# Patient Record
Sex: FEMALE | Race: ASIAN | NOT HISPANIC OR LATINO | Employment: STUDENT | URBAN - METROPOLITAN AREA
[De-identification: names, ages, dates, MRNs, and addresses within clinical notes are randomized per-mention and may not be internally consistent; named-entity substitution may affect disease eponyms.]

---

## 2024-08-15 ENCOUNTER — APPOINTMENT (OUTPATIENT)
Dept: CT IMAGING | Facility: CLINIC | Age: 19
End: 2024-08-15
Attending: PHYSICIAN ASSISTANT

## 2024-08-15 ENCOUNTER — HOSPITAL ENCOUNTER (EMERGENCY)
Facility: CLINIC | Age: 19
Discharge: HOME | End: 2024-08-15
Admitting: PHYSICIAN ASSISTANT

## 2024-08-15 VITALS
DIASTOLIC BLOOD PRESSURE: 59 MMHG | TEMPERATURE: 98 F | WEIGHT: 105.8 LBS | HEART RATE: 50 BPM | RESPIRATION RATE: 16 BRPM | BODY MASS INDEX: 18.06 KG/M2 | OXYGEN SATURATION: 97 % | HEIGHT: 64 IN | SYSTOLIC BLOOD PRESSURE: 105 MMHG

## 2024-08-15 DIAGNOSIS — R07.9 CHEST PAIN: ICD-10-CM

## 2024-08-15 DIAGNOSIS — R11.2 CANNABINOID HYPEREMESIS SYNDROME: ICD-10-CM

## 2024-08-15 DIAGNOSIS — F12.90 CANNABINOID HYPEREMESIS SYNDROME: ICD-10-CM

## 2024-08-15 DIAGNOSIS — R11.2 NAUSEA AND VOMITING: ICD-10-CM

## 2024-08-15 LAB
ALBUMIN SERPL BCG-MCNC: 4.7 G/DL (ref 3.5–5.2)
ALP SERPL-CCNC: 49 U/L (ref 40–150)
ALT SERPL W P-5'-P-CCNC: 8 U/L (ref 0–50)
ANION GAP SERPL CALCULATED.3IONS-SCNC: 14 MMOL/L (ref 7–15)
AST SERPL W P-5'-P-CCNC: 21 U/L (ref 0–45)
ATRIAL RATE - MUSE: 49 BPM
BASOPHILS # BLD AUTO: 0.1 10E3/UL (ref 0–0.2)
BASOPHILS NFR BLD AUTO: 0 %
BILIRUB DIRECT SERPL-MCNC: <0.2 MG/DL (ref 0–0.3)
BILIRUB SERPL-MCNC: 0.7 MG/DL
BUN SERPL-MCNC: 13.2 MG/DL (ref 6–20)
CALCIUM SERPL-MCNC: 9.3 MG/DL (ref 8.8–10.4)
CHLORIDE SERPL-SCNC: 103 MMOL/L (ref 98–107)
CREAT SERPL-MCNC: 0.74 MG/DL (ref 0.51–0.95)
D DIMER PPP FEU-MCNC: 0.52 UG/ML FEU (ref 0–0.5)
DIASTOLIC BLOOD PRESSURE - MUSE: 68 MMHG
EGFRCR SERPLBLD CKD-EPI 2021: >90 ML/MIN/1.73M2
EOSINOPHIL # BLD AUTO: 0 10E3/UL (ref 0–0.7)
EOSINOPHIL NFR BLD AUTO: 0 %
ERYTHROCYTE [DISTWIDTH] IN BLOOD BY AUTOMATED COUNT: 12.7 % (ref 10–15)
GLUCOSE SERPL-MCNC: 107 MG/DL (ref 70–99)
HCG SERPL QL: NEGATIVE
HCO3 SERPL-SCNC: 25 MMOL/L (ref 22–29)
HCT VFR BLD AUTO: 45.8 % (ref 35–47)
HGB BLD-MCNC: 15.5 G/DL (ref 11.7–15.7)
IMM GRANULOCYTES # BLD: 0.1 10E3/UL
IMM GRANULOCYTES NFR BLD: 1 %
INTERPRETATION ECG - MUSE: NORMAL
LIPASE SERPL-CCNC: 34 U/L (ref 13–60)
LYMPHOCYTES # BLD AUTO: 0.9 10E3/UL (ref 0.8–5.3)
LYMPHOCYTES NFR BLD AUTO: 7 %
MAGNESIUM SERPL-MCNC: 2.3 MG/DL (ref 1.7–2.3)
MCH RBC QN AUTO: 30.1 PG (ref 26.5–33)
MCHC RBC AUTO-ENTMCNC: 33.8 G/DL (ref 31.5–36.5)
MCV RBC AUTO: 89 FL (ref 78–100)
MONOCYTES # BLD AUTO: 0.5 10E3/UL (ref 0–1.3)
MONOCYTES NFR BLD AUTO: 4 %
NEUTROPHILS # BLD AUTO: 11.5 10E3/UL (ref 1.6–8.3)
NEUTROPHILS NFR BLD AUTO: 88 %
NRBC # BLD AUTO: 0 10E3/UL
NRBC BLD AUTO-RTO: 0 /100
P AXIS - MUSE: 54 DEGREES
PLATELET # BLD AUTO: 324 10E3/UL (ref 150–450)
POTASSIUM SERPL-SCNC: 3.5 MMOL/L (ref 3.4–5.3)
PR INTERVAL - MUSE: 160 MS
PROT SERPL-MCNC: 8.2 G/DL (ref 6.4–8.3)
QRS DURATION - MUSE: 98 MS
QT - MUSE: 474 MS
QTC - MUSE: 428 MS
R AXIS - MUSE: 83 DEGREES
RBC # BLD AUTO: 5.15 10E6/UL (ref 3.8–5.2)
SODIUM SERPL-SCNC: 142 MMOL/L (ref 135–145)
SYSTOLIC BLOOD PRESSURE - MUSE: 110 MMHG
T AXIS - MUSE: 59 DEGREES
TROPONIN T SERPL HS-MCNC: <6 NG/L
VENTRICULAR RATE- MUSE: 49 BPM
WBC # BLD AUTO: 13 10E3/UL (ref 4–11)

## 2024-08-15 PROCEDURE — 99285 EMERGENCY DEPT VISIT HI MDM: CPT | Mod: 25

## 2024-08-15 PROCEDURE — 96361 HYDRATE IV INFUSION ADD-ON: CPT

## 2024-08-15 PROCEDURE — 84155 ASSAY OF PROTEIN SERUM: CPT | Performed by: PHYSICIAN ASSISTANT

## 2024-08-15 PROCEDURE — 82565 ASSAY OF CREATININE: CPT | Performed by: PHYSICIAN ASSISTANT

## 2024-08-15 PROCEDURE — 83735 ASSAY OF MAGNESIUM: CPT | Performed by: PHYSICIAN ASSISTANT

## 2024-08-15 PROCEDURE — 96374 THER/PROPH/DIAG INJ IV PUSH: CPT | Mod: 59

## 2024-08-15 PROCEDURE — 93005 ELECTROCARDIOGRAM TRACING: CPT | Performed by: EMERGENCY MEDICINE

## 2024-08-15 PROCEDURE — 84484 ASSAY OF TROPONIN QUANT: CPT | Performed by: PHYSICIAN ASSISTANT

## 2024-08-15 PROCEDURE — 85379 FIBRIN DEGRADATION QUANT: CPT | Performed by: PHYSICIAN ASSISTANT

## 2024-08-15 PROCEDURE — 71275 CT ANGIOGRAPHY CHEST: CPT

## 2024-08-15 PROCEDURE — 96375 TX/PRO/DX INJ NEW DRUG ADDON: CPT

## 2024-08-15 PROCEDURE — 85025 COMPLETE CBC W/AUTO DIFF WBC: CPT | Performed by: PHYSICIAN ASSISTANT

## 2024-08-15 PROCEDURE — 82374 ASSAY BLOOD CARBON DIOXIDE: CPT | Performed by: PHYSICIAN ASSISTANT

## 2024-08-15 PROCEDURE — 84703 CHORIONIC GONADOTROPIN ASSAY: CPT | Performed by: PHYSICIAN ASSISTANT

## 2024-08-15 PROCEDURE — 83690 ASSAY OF LIPASE: CPT | Performed by: PHYSICIAN ASSISTANT

## 2024-08-15 PROCEDURE — 250N000011 HC RX IP 250 OP 636: Performed by: PHYSICIAN ASSISTANT

## 2024-08-15 PROCEDURE — 36415 COLL VENOUS BLD VENIPUNCTURE: CPT | Performed by: PHYSICIAN ASSISTANT

## 2024-08-15 PROCEDURE — 250N000013 HC RX MED GY IP 250 OP 250 PS 637: Performed by: PHYSICIAN ASSISTANT

## 2024-08-15 PROCEDURE — 258N000003 HC RX IP 258 OP 636: Performed by: PHYSICIAN ASSISTANT

## 2024-08-15 RX ORDER — LIDOCAINE 4 G/G
1 PATCH TOPICAL ONCE
Status: DISCONTINUED | OUTPATIENT
Start: 2024-08-15 | End: 2024-08-15 | Stop reason: HOSPADM

## 2024-08-15 RX ORDER — IOPAMIDOL 755 MG/ML
75 INJECTION, SOLUTION INTRAVASCULAR ONCE
Status: COMPLETED | OUTPATIENT
Start: 2024-08-15 | End: 2024-08-15

## 2024-08-15 RX ORDER — ONDANSETRON 4 MG/1
4 TABLET, ORALLY DISINTEGRATING ORAL EVERY 8 HOURS PRN
Qty: 15 TABLET | Refills: 0 | Status: SHIPPED | OUTPATIENT
Start: 2024-08-15

## 2024-08-15 RX ORDER — LIDOCAINE 4 G/G
1 PATCH TOPICAL
Status: DISCONTINUED | OUTPATIENT
Start: 2024-08-15 | End: 2024-08-15

## 2024-08-15 RX ORDER — ONDANSETRON 2 MG/ML
4 INJECTION INTRAMUSCULAR; INTRAVENOUS ONCE
Status: COMPLETED | OUTPATIENT
Start: 2024-08-15 | End: 2024-08-15

## 2024-08-15 RX ORDER — KETOROLAC TROMETHAMINE 15 MG/ML
15 INJECTION, SOLUTION INTRAMUSCULAR; INTRAVENOUS ONCE
Status: COMPLETED | OUTPATIENT
Start: 2024-08-15 | End: 2024-08-15

## 2024-08-15 RX ADMIN — ONDANSETRON 4 MG: 2 INJECTION INTRAMUSCULAR; INTRAVENOUS at 13:15

## 2024-08-15 RX ADMIN — SODIUM CHLORIDE 1000 ML: 9 INJECTION, SOLUTION INTRAVENOUS at 13:15

## 2024-08-15 RX ADMIN — LIDOCAINE 1 PATCH: 4 PATCH TOPICAL at 14:53

## 2024-08-15 RX ADMIN — IOPAMIDOL 75 ML: 755 INJECTION, SOLUTION INTRAVENOUS at 14:29

## 2024-08-15 RX ADMIN — KETOROLAC TROMETHAMINE 15 MG: 15 INJECTION, SOLUTION INTRAMUSCULAR; INTRAVENOUS at 13:18

## 2024-08-15 ASSESSMENT — COLUMBIA-SUICIDE SEVERITY RATING SCALE - C-SSRS
1. IN THE PAST MONTH, HAVE YOU WISHED YOU WERE DEAD OR WISHED YOU COULD GO TO SLEEP AND NOT WAKE UP?: NO
6. HAVE YOU EVER DONE ANYTHING, STARTED TO DO ANYTHING, OR PREPARED TO DO ANYTHING TO END YOUR LIFE?: NO
2. HAVE YOU ACTUALLY HAD ANY THOUGHTS OF KILLING YOURSELF IN THE PAST MONTH?: NO

## 2024-08-15 ASSESSMENT — ACTIVITIES OF DAILY LIVING (ADL)
ADLS_ACUITY_SCORE: 35

## 2024-08-15 NOTE — DISCHARGE INSTRUCTIONS
I have a suspicion that you are dealing with cannabinoid hyperemesis syndrome with your regular marijuana use.    The chest pain you are experiencing is likely due from all of the vomiting you are having.    We could not identify anything acutely serious or life-threatening here based on your labs and imaging.    I have sent you an antinausea medication to the pharmacy to use as needed.  I recommend that you discontinue all marijuana products.    Use Tylenol and ibuprofen as well as heating pads for the chest pain.  -Acetaminophen (Tylenol) and Ibuprofen (Advil, Motrin)   >These two medications can be alternated every 3-4 hours or taken together every 6 hours.   >Acetaminophen (Tylenol): Appropriate doses include 500mg, 650mg, 1000mg (not to exceed 4000mg in 24 hours)  >Ibuprofen (Advil, Motrin): Appropriate doses include 400mg, 600mg, 800mg (not to exceed 3200mg in 24 hours)   -Topical analgesics  >Lidocaine patches, IcyHot, Biofreeze, [place] Balm, etc.   -Ice and heat   >Ice is good for acute injuries that cause swelling and pain.   >Heat is good for aches and pains including pulled muscles or muscle spasms.     Return to the ER for any new or worsening symptoms.

## 2024-08-15 NOTE — ED PROVIDER NOTES
EMERGENCY DEPARTMENT ENCOUNTER   NAME: Murphy Pérez ; AGE: 19 year old female ; YOB: 2005 ; MRN: [id number] ; PCP: Tiffani Franz     Evaluation Date & Time: 8/15/2024 12:18 PM    ED Provider: Beata Bellamy PA-C    CHIEF COMPLAINT     Chest Pain and Vomiting      FINAL ASSESSMENT       ICD-10-CM    1. Nausea and vomiting  R11.2       2. Chest pain  R07.9       3. Cannabinoid hyperemesis syndrome  R11.2     F12.90           ED COURSE, MEDICAL DECISION MAKING, PLAN     ED course     12:21 PM Evaluated patient. Performed physical exam. Plan for line, labs, meds, and imaging (Xray vs CT pending dimer).   1:31 PM Dimer marginally elevated at 0.52. CT PE study ordered.   3:06 PM Rechecked and updated patient. Discharge and follow up plans discussed. RN to discharge.   ______________________________________________________________________    Reason for visit: Murphy Pérez is a 19 year old female with no pertinent past medical history presenting for chest pain/heaviness, shortness of breath, and vomiting since yesterday. States had same symptoms happen about a month ago and was seen without any significant findings. Pt admits to regular THC use with gummies and inhalation.     Exam: Pt is uncomfortable appearing and breathing heavily. Dry lips. Mild palpable pain over the anterior chest wall with palpation. Vitally normal.     Acutely serious/life threatening considerations: ACS, carditis, PE, tension pneumothorax, DKA, esophageal rupture, abdominal etiology       Labs: Workup remarkable for a slightly elevated D-dimer at 0.52 and a minimally elevated white blood cell count at 13.  The rest of the labs are unrevealing including a troponin less than 6, normal BMP, hepatic panel, magnesium, lipase.  Negative hCG.    EKG: Sinus bradycardia with sinus arrhythmia with a rate of 49 without ischemia or STEMI.    Imaging: A chest CT pulmonary embolism with contrast obtained.  Radiologist read:  1.  No  acute pulmonary embolism or aortopathy.  2.  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    Consults: N/A    Interventions/recheck: Patient was given 1 L of normal saline, Zofran, Toradol, and a lidocaine patch.  She notes improvement in symptoms with these interventions.    Assessment: I have a suspicion that she could be dealing with cannabinoid hyperemesis syndrome especially considering she had the same thing happen back in June with a negative workup and she does report regular THC use.  The chest pain she is experiencing is likely related to the retching causing the Sydnie phenomenon described by the radiologist.  Very low concern for ACS or carditis with unremarkable EKG and normal troponin.  No PE seen on CT.  No pneumothorax.  No esophageal rupture.  Abdominal etiology unlikely without any palpable abdominal pain.  Overall, no red flag s/s present to suggest an acutely serious or life threatening condition that would necessitate hospitalization.     Plan: Zofran Rx sent to the pharmacy for nausea.  Recommended pushing fluids.  [place] diet as tolerated.  Strongly encouraged her to discontinue THC products.  Indications for re-evaluation in the ER discussed.   Patient/parent/guardian understanding and agreeable with the plan and will discharge to home in good condition.       *All pertinent lab & imaging studies independently reviewed. (See chart for details)   *Discussed the results of all the tests and plan with patient and family/guardians.   ______________________________________________________________________    Critical Care    N/A    HISTORY OF PRESENT ILLNESS   Patient information was obtained from: Patient    Use of Intrepreter: N/A     Murphy Pérez is here by means of walk in with  "father for evaluation of chest tightness/heaviness, shortness of breath, and vomiting that started yesterday.     Pt states she had similar symptoms happen about a month ago and was seen, but nothing was found to be abnormal.     States she has been unable to eat and drink due to symptoms.     No cough, runny nose, sore throat.   No fevers, chills, body aches.   No diarrhea.   No bloody emesis.     Admits to regular THC use. States she uses gummies and inhalation every other day.     Denies chance of pregnancy.     No other concerns.     Per my chart review,   6/22/2024: [facility] for nausea, vomiting, chest pain x3 days. Labs and US reassuring. Dx with likely viral syndrome. Pt was given GI cocktail and fluids and discharged home with Zofran.     MEDICAL HISTORY     No past medical history on file.    No past surgical history on file.    No family history on file.         ondansetron (ZOFRAN ODT) 4 MG ODT tab          PHYSICAL EXAM     First Vitals:  Patient Vitals for the past 24 hrs:   BP Temp Temp src Pulse Resp SpO2 Height Weight   08/15/24 1515 105/59 -- -- 50 16 97 % -- --   08/15/24 1500 107/59 -- -- 52 16 100 % -- --   08/15/24 1445 109/59 -- -- 54 16 100 % -- --   08/15/24 1430 106/57 -- -- 64 16 95 % -- --   08/15/24 1230 114/53 -- -- 51 16 100 % -- --   08/15/24 1214 116/67 98  F (36.7  C) Temporal 52 18 100 % 1.626 m (5' 4\") 48 kg (105 lb 12.8 oz)       PHYSICAL EXAM:   Constitutional: Uncomfortable appearing. Breathing heavy.   Neuro: Awake and alert. No focal deficits.  Psych: Calm and cooperative.  Eyes: PERRL. EOMI. Conjunctivae clear. No crusting or mattering of the lids or lashes.    Mouth: Oropharynx is dry.    Neck: FROM.  Chest: Mild diffuse palpable pain to the anterior chest wall.   Cardio: Regular rate. Adequate perfusion to extremities. Regular rhythm. No murmurs.  Pulmonary: Breathing heavy, but oxygenating well on RA. CTA b/l.  Abdomen: BS present. Soft and non-distended. No " palpable pain.   Upper extremities: Moves freely.   Lower extremities: Moves freely. No edema.   Skin: Natural color, warm, dry, intact.       RESULTS     LAB:  All pertinent labs reviewed and interpreted  Labs Ordered and Resulted from Time of ED Arrival to Time of ED Departure   BASIC METABOLIC PANEL - Abnormal       Result Value    Sodium 142      Potassium 3.5      Chloride 103      Carbon Dioxide (CO2) 25      Anion Gap 14      Urea Nitrogen 13.2      Creatinine 0.74      GFR Estimate >90      Calcium 9.3      Glucose 107 (*)    D DIMER QUANTITATIVE - Abnormal    D-Dimer Quantitative 0.52 (*)    CBC WITH PLATELETS AND DIFFERENTIAL - Abnormal    WBC Count 13.0 (*)     RBC Count 5.15      Hemoglobin 15.5      Hematocrit 45.8      MCV 89      MCH 30.1      MCHC 33.8      RDW 12.7      Platelet Count 324      % Neutrophils 88      % Lymphocytes 7      % Monocytes 4      % Eosinophils 0      % Basophils 0      % Immature Granulocytes 1      NRBCs per 100 WBC 0      Absolute Neutrophils 11.5 (*)     Absolute Lymphocytes 0.9      Absolute Monocytes 0.5      Absolute Eosinophils 0.0      Absolute Basophils 0.1      Absolute Immature Granulocytes 0.1      Absolute NRBCs 0.0     HEPATIC FUNCTION PANEL - Normal    Protein Total 8.2      Albumin 4.7      Bilirubin Total 0.7      Alkaline Phosphatase 49      AST 21      ALT 8      Bilirubin Direct <0.20     LIPASE - Normal    Lipase 34     MAGNESIUM - Normal    Magnesium 2.3     TROPONIN T, HIGH SENSITIVITY - Normal    Troponin T, High Sensitivity <6     HCG QUALITATIVE PREGNANCY - Normal    hCG Serum Qualitative Negative         RADIOLOGY:  CT Chest Pulmonary Embolism w Contrast   Final Result   IMPRESSION:      1.  No acute pulmonary embolism or aortopathy.   2.  Small foci of pulmonary interstitial emphysema along the interlobar fissures and central bronchovascular structures inferior right hilum most often associated with Sydnie phenomenon related to increased  intrathoracic pressure (coughing, retching,    etc...).          ECG:  EKG was collected and independently interpreted by myself and also confirmed by MD.    Findings: Sinus bradycardia with sinus arrhythmia with a rate of 49.  No interval prolongation.  No ischemia or STEMI.    Comparisons: No comparisons available      PROCEDURES     None         MEDICAL DECISION MAKING:  Obtained supplemental history:Supplemental history obtained?: No  Reviewed external records: External records reviewed?: Outpatient Record: See [facility]  Care impacted by chronic illness:N/A  Care significantly affected by social determinants of health:N/A  Did you consider but not order tests?: Work up considered but not performed and documented in chart, if applicable  Did you interpret images independently?: Independent interpretation of ECG and images noted in documentation, when applicable.  Consultation discussion with other provider:Did you involve another provider (consultant, , pharmacy, etc.)?: No  Discharge. I prescribed additional prescription strength medication(s) as charted. See documentation for any additional details.      FINAL IMPRESSION:    ICD-10-CM    1. Nausea and vomiting  R11.2       2. Chest pain  R07.9       3. Cannabinoid hyperemesis syndrome  R11.2     F12.90             MEDICATIONS GIVEN IN THE EMERGENCY DEPARTMENT:  Medications   Lidocaine (LIDOCARE) 4 % Patch 1 patch (1 patch Transdermal $Patch/Med Applied 8/15/24 1453)   sodium chloride 0.9% BOLUS 1,000 mL (0 mLs Intravenous Stopped 8/15/24 1454)   ondansetron (ZOFRAN) injection 4 mg (4 mg Intravenous $Given 8/15/24 1315)   ketorolac (TORADOL) injection 15 mg (15 mg Intravenous $Given 8/15/24 1318)   iopamidol (ISOVUE-370) solution 75 mL (75 mLs Intravenous $Given 8/15/24 1429)         NEW PRESCRIPTIONS STARTED AT TODAY'S ED VISIT:  Discharge Medication List as of 8/15/2024  3:17 PM        START taking these medications    Details   ondansetron (ZOFRAN ODT) 4 MG ODT  tab Take 1 tablet (4 mg) by mouth every 8 hours as needed for nausea, Disp-15 tablet, R-0, E-Prescribe                   Some or all of this documentation has been completed using dictation software and mild grammatical errors may be present. Please contact me with any concerns regarding this.       Beata Bellamy PA-C  Emergency Medicine   [facility] EMERGENCY ROOM       Beata Bellamy PA-C  08/15/24 9220

## 2024-08-15 NOTE — ED TRIAGE NOTES
Arrives to ED with c/o medial anterior CP x1 month. Has been worked up for same sx without dx. Reports CP worse. Recent travel from [place]. Pain worse with deep breathing. Reports vomiting beginning yesterday. Tried zofran without relief. Denies cardiopulmonary hx.      Triage Assessment (Adult)       Row Name 08/15/24 1215          Triage Assessment    Airway WDL WDL        Respiratory WDL    Respiratory WDL WDL        Skin Circulation/Temperature WDL    Skin Circulation/Temperature WDL WDL        Cardiac WDL    Cardiac WDL X;chest pain        Peripheral/Neurovascular WDL    Peripheral Neurovascular WDL WDL        Cognitive/Neuro/Behavioral WDL    Cognitive/Neuro/Behavioral WDL WDL